# Patient Record
Sex: FEMALE | Race: BLACK OR AFRICAN AMERICAN | NOT HISPANIC OR LATINO | URBAN - METROPOLITAN AREA
[De-identification: names, ages, dates, MRNs, and addresses within clinical notes are randomized per-mention and may not be internally consistent; named-entity substitution may affect disease eponyms.]

---

## 2018-08-20 ENCOUNTER — APPOINTMENT (OUTPATIENT)
Dept: RADIOLOGY | Facility: MEDICAL CENTER | Age: 53
End: 2018-08-20
Attending: EMERGENCY MEDICINE
Payer: COMMERCIAL

## 2018-08-20 ENCOUNTER — HOSPITAL ENCOUNTER (EMERGENCY)
Facility: MEDICAL CENTER | Age: 53
End: 2018-08-20
Attending: EMERGENCY MEDICINE
Payer: COMMERCIAL

## 2018-08-20 VITALS
OXYGEN SATURATION: 98 % | DIASTOLIC BLOOD PRESSURE: 85 MMHG | BODY MASS INDEX: 33.55 KG/M2 | RESPIRATION RATE: 20 BRPM | HEIGHT: 66 IN | SYSTOLIC BLOOD PRESSURE: 142 MMHG | WEIGHT: 208.78 LBS | TEMPERATURE: 98 F | HEART RATE: 72 BPM

## 2018-08-20 DIAGNOSIS — M75.21 BICIPITAL TENDONITIS OF RIGHT SHOULDER: ICD-10-CM

## 2018-08-20 LAB
ALBUMIN SERPL BCP-MCNC: 4 G/DL (ref 3.2–4.9)
ALBUMIN/GLOB SERPL: 1.1 G/DL
ALP SERPL-CCNC: 83 U/L (ref 30–99)
ALT SERPL-CCNC: 19 U/L (ref 2–50)
ANION GAP SERPL CALC-SCNC: 9 MMOL/L (ref 0–11.9)
AST SERPL-CCNC: 17 U/L (ref 12–45)
BASOPHILS # BLD AUTO: 0.5 % (ref 0–1.8)
BASOPHILS # BLD: 0.06 K/UL (ref 0–0.12)
BILIRUB SERPL-MCNC: 0.4 MG/DL (ref 0.1–1.5)
BNP SERPL-MCNC: 13 PG/ML (ref 0–100)
BUN SERPL-MCNC: 9 MG/DL (ref 8–22)
CALCIUM SERPL-MCNC: 9.7 MG/DL (ref 8.5–10.5)
CHLORIDE SERPL-SCNC: 100 MMOL/L (ref 96–112)
CO2 SERPL-SCNC: 27 MMOL/L (ref 20–33)
CREAT SERPL-MCNC: 0.76 MG/DL (ref 0.5–1.4)
EKG IMPRESSION: NORMAL
EOSINOPHIL # BLD AUTO: 0.14 K/UL (ref 0–0.51)
EOSINOPHIL NFR BLD: 1.2 % (ref 0–6.9)
ERYTHROCYTE [DISTWIDTH] IN BLOOD BY AUTOMATED COUNT: 45 FL (ref 35.9–50)
GLOBULIN SER CALC-MCNC: 3.8 G/DL (ref 1.9–3.5)
GLUCOSE SERPL-MCNC: 265 MG/DL (ref 65–99)
HCT VFR BLD AUTO: 39.9 % (ref 37–47)
HGB BLD-MCNC: 12.2 G/DL (ref 12–16)
IMM GRANULOCYTES # BLD AUTO: 0.07 K/UL (ref 0–0.11)
IMM GRANULOCYTES NFR BLD AUTO: 0.6 % (ref 0–0.9)
LYMPHOCYTES # BLD AUTO: 3.98 K/UL (ref 1–4.8)
LYMPHOCYTES NFR BLD: 32.9 % (ref 22–41)
MCH RBC QN AUTO: 26.8 PG (ref 27–33)
MCHC RBC AUTO-ENTMCNC: 30.6 G/DL (ref 33.6–35)
MCV RBC AUTO: 87.7 FL (ref 81.4–97.8)
MONOCYTES # BLD AUTO: 0.92 K/UL (ref 0–0.85)
MONOCYTES NFR BLD AUTO: 7.6 % (ref 0–13.4)
NEUTROPHILS # BLD AUTO: 6.94 K/UL (ref 2–7.15)
NEUTROPHILS NFR BLD: 57.2 % (ref 44–72)
NRBC # BLD AUTO: 0 K/UL
NRBC BLD-RTO: 0 /100 WBC
PLATELET # BLD AUTO: 305 K/UL (ref 164–446)
PMV BLD AUTO: 11.3 FL (ref 9–12.9)
POTASSIUM SERPL-SCNC: 4.2 MMOL/L (ref 3.6–5.5)
PROT SERPL-MCNC: 7.8 G/DL (ref 6–8.2)
RBC # BLD AUTO: 4.55 M/UL (ref 4.2–5.4)
SODIUM SERPL-SCNC: 136 MMOL/L (ref 135–145)
TROPONIN I SERPL-MCNC: <0.01 NG/ML (ref 0–0.04)
TROPONIN I SERPL-MCNC: <0.01 NG/ML (ref 0–0.04)
WBC # BLD AUTO: 12.1 K/UL (ref 4.8–10.8)

## 2018-08-20 PROCEDURE — A9270 NON-COVERED ITEM OR SERVICE: HCPCS | Performed by: EMERGENCY MEDICINE

## 2018-08-20 PROCEDURE — 99284 EMERGENCY DEPT VISIT MOD MDM: CPT

## 2018-08-20 PROCEDURE — 700102 HCHG RX REV CODE 250 W/ 637 OVERRIDE(OP): Performed by: EMERGENCY MEDICINE

## 2018-08-20 PROCEDURE — 93005 ELECTROCARDIOGRAM TRACING: CPT | Performed by: EMERGENCY MEDICINE

## 2018-08-20 PROCEDURE — 85025 COMPLETE CBC W/AUTO DIFF WBC: CPT

## 2018-08-20 PROCEDURE — 93005 ELECTROCARDIOGRAM TRACING: CPT

## 2018-08-20 PROCEDURE — 84484 ASSAY OF TROPONIN QUANT: CPT

## 2018-08-20 PROCEDURE — 71045 X-RAY EXAM CHEST 1 VIEW: CPT

## 2018-08-20 PROCEDURE — 36415 COLL VENOUS BLD VENIPUNCTURE: CPT

## 2018-08-20 PROCEDURE — 80053 COMPREHEN METABOLIC PANEL: CPT

## 2018-08-20 PROCEDURE — 83880 ASSAY OF NATRIURETIC PEPTIDE: CPT

## 2018-08-20 RX ORDER — IBUPROFEN 600 MG/1
600 TABLET ORAL ONCE
Status: COMPLETED | OUTPATIENT
Start: 2018-08-20 | End: 2018-08-20

## 2018-08-20 RX ORDER — CYCLOBENZAPRINE HCL 10 MG
10 TABLET ORAL 3 TIMES DAILY PRN
Qty: 15 TAB | Refills: 0 | Status: SHIPPED
Start: 2018-08-20

## 2018-08-20 RX ORDER — LISINOPRIL 10 MG/1
10 TABLET ORAL DAILY
COMMUNITY

## 2018-08-20 RX ORDER — IBUPROFEN 800 MG/1
800 TABLET ORAL EVERY 8 HOURS PRN
Qty: 30 TAB | Refills: 0 | Status: SHIPPED
Start: 2018-08-20

## 2018-08-20 RX ADMIN — IBUPROFEN 600 MG: 600 TABLET, FILM COATED ORAL at 11:17

## 2018-08-20 ASSESSMENT — PAIN SCALES - GENERAL
PAINLEVEL_OUTOF10: 2
PAINLEVEL_OUTOF10: 9

## 2018-08-20 NOTE — ED NOTES
Break RN: Patient ambulatory back to RD [address] from [facility] w/steady gait.  Urine specimen sent to lab.  ERP at bedside at this time.

## 2018-08-20 NOTE — ED NOTES
Explained discharge instructions to patient with all questions answered.  Patient encouraged to follow up with PCP, and return to the ER for worsening symptoms.  VSS upon discharge.  Patient ambulated to lobby with steady gait.  Patient confirmed that he had a safe way to get home.

## 2018-08-20 NOTE — DISCHARGE INSTRUCTIONS
Bicipital Tendonitis  Bicipital tendonitis refers to redness, soreness, and swelling (inflammation) or irritation of the bicep tendon. The biceps muscle is located between the elbow and shoulder of the inner arm. The tendon heads, similar to pieces of rope, connect the bicep muscle to the shoulder socket. They are called short head and long head tendons. When tendonitis occurs, the long head tendon is inflamed and swollen, and may be thickened or partially torn.   Bicipital tendonitis can occur with other problems as well, such as arthritis in the shoulder or acromioclavicular joints, tears in the tendons, or other rotator cuff problems.   CAUSES   Overuse of of the arms for overhead activities is the major cause of tendonitis. Many athletes, such as swimmers, baseball players, and tennis players are prone to bicipital tendonitis. Jobs that require manual labor or routine chores, especially chores involving overhead activities can result in overuse and tendonitis.  SYMPTOMS  Symptoms may include:  · Pain in and around the front of the shoulder. Pain may be worse with overhead motion.  · Pain or aching that radiates down the arm.  · Clicking or shifting sensations in the shoulder.  DIAGNOSIS  Your caregiver may perform the following:  · Physical exam and tests of the biceps and shoulder to observe range of motion, strength, and stability.  · X-rays or magnetic resonance imaging (MRI) to confirm the diagnosis. In most common cases, these tests are not necessary.  Since other problems may exist in the shoulder or rotator cuff, additional tests may be recommended.  TREATMENT  Treatment may include the following:  · Medications  ¨ Your caregiver may prescribe over-the-counter pain relievers.  ¨ Steroid injections, such as cortisone, may be recommended. These may help to reduce inflammation and pain.  · Physical Therapy - Your caregiver may recommend gentle exercises with the arm. These can help restore strength and  range of motion. They may be done at home or with a physical therapist's supervision and input.  · Surgery - Arthroscopic or open surgery sometimes is necessary. Surgery may include:  ¨ Reattachment or repair of the tendon at the shoulder socket.  ¨ Removal of the damaged section of the tendon.  ¨ Anchoring the tendon to a different area of the shoulder (tenodesis).  HOME CARE INSTRUCTIONS   · Avoid overhead motion of the affected arm or any other motion that causes pain.  · Take medication for pain as directed. Do not take these for more than 3 weeks, unless directed to do so by your caregiver.  · Ice the affected area for 20 minutes at a time, 3-4 times per day. Place a towel on the skin over the painful area and the ice or cold pack over the towel. Do not place ice directly on the skin.  · Perform gentle exercises at home as directed. These will increase strength and flexibility.  PREVENTION  · Modify your activities as much as possible to protect your arm. A physical therapist or sports medicine physician can help you understand options for safe motion.  · Avoid repetitive overhead pulling, lifting, reaching, and throwing until your caregiver tells you it is ok to resume these activities.  SEEK MEDICAL CARE IF:  · Your pain worsens.  · You have difficulty moving the affected arm.  · You have trouble performing any of the self-care instructions.  MAKE SURE YOU:   · Understand these instructions.  · Will watch your condition.  · Will get help right away if you are not doing well or get worse.     This information is not intended to replace advice given to you by your health care provider. Make sure you discuss any questions you have with your health care provider.     Document Released: 01/20/2012 Document Revised: 03/11/2013 Document Reviewed: 01/20/2012  Elsevier Interactive Patient Education ©2016 [employer]  Muscle Strain  A muscle strain is an injury that occurs when a muscle is stretched beyond its normal  length. Usually a small number of muscle fibers are torn when this happens. Muscle strain is rated in degrees. First-degree strains have the least amount of muscle fiber tearing and pain. Second-degree and third-degree strains have increasingly more tearing and pain.  Usually, recovery from muscle strain takes 1-2 weeks. Complete healing takes 5-6 weeks.  What are the causes?  Muscle strain happens when a sudden, violent force placed on a muscle stretches it too far. This may occur with lifting, sports, or a fall.  What increases the risk?  Muscle strain is especially common in athletes.  What are the signs or symptoms?  At the site of the muscle strain, there may be:  · Pain.  · Bruising.  · Swelling.  · Difficulty using the muscle due to pain or lack of normal function.  How is this diagnosed?  Your health care provider will perform a physical exam and ask about your medical history.  How is this treated?  Often, the best treatment for a muscle strain is resting, icing, and applying cold compresses to the injured area.  Follow these instructions at home:  · Use the PRICE method of treatment to promote muscle healing during the first 2-3 days after your injury. The PRICE method involves:  ¨ Protecting the muscle from being injured again.  ¨ Restricting your activity and resting the injured body part.  ¨ Icing your injury. To do this, put ice in a plastic bag. Place a towel between your skin and the bag. Then, apply the ice and leave it on from 15-20 minutes each hour. After the third day, switch to moist heat packs.  ¨ Apply compression to the injured area with a splint or elastic bandage. Be careful not to wrap it too tightly. This may interfere with blood circulation or increase swelling.  ¨ Elevate the injured body part above the level of your heart as often as you can.  · Only take over-the-counter or prescription medicines for pain, discomfort, or fever as directed by your health care provider.  · Warming up  prior to exercise helps to prevent future muscle strains.  Contact a health care provider if:  · You have increasing pain or swelling in the injured area.  · You have numbness, tingling, or a significant loss of strength in the injured area.  This information is not intended to replace advice given to you by your health care provider. Make sure you discuss any questions you have with your health care provider.  Document Released: 12/18/2006 Document Revised: 05/25/2017 Document Reviewed: 07/17/2014  Elsevier Interactive Patient Education © 2017 [employer]

## 2018-08-20 NOTE — ED PROVIDER NOTES
ED Provider Note    CHIEF COMPLAINT  Chief Complaint   Patient presents with   • Arm Pain   • Extremity Weakness     right arm       HPI  Annalee Hillman is a 53 y.o. female here for evaluation of right shoulder pain.  The patient states that she has had progressive right shoulder pain over the last day, and states that the pain causes her handgrip to be weak.  She has no fever, no vomiting, no chest pain, and no shortness of breath.  She and her  flew up from [place] this morning, and are here visiting.  She has no diaphoresis, no back pain, and no leg pain.  She states that the arm pain is reproducible with raising her arm and moving it in circles.  She is not taking anything for the pain, and states that it is exacerbated by rotating the shoulder, and alleviated by remaining still.    PAST MEDICAL HISTORY   has a past medical history of Diabetes (HCC) and Hypertension.    SOCIAL HISTORY  Social History     Social History Main Topics   • Smoking status: Never Smoker   • Smokeless tobacco: Never Used   • Alcohol use Yes      Comment: Socially   • Drug use: No   • Sexual activity: Not on file       SURGICAL HISTORY  patient denies any surgical history    CURRENT MEDICATIONS  Home Medications     Reviewed by Dg Andersen R.N. (Registered Nurse) on 08/20/18 at 0928  Med List Status: Complete   Medication Last Dose Status   lisinopril (PRINIVIL) 10 MG Tab  Active   metFORMIN (GLUCOPHAGE) 500 MG Tab  Active                ALLERGIES  No Known Allergies    REVIEW OF SYSTEMS  See HPI for further details. Review of systems as above, otherwise all other systems are negative.     PHYSICAL EXAM  Constitutional: Well developed, well nourished. No acute distress.  HEENT: Normocephalic, atraumatic. Posterior pharynx clear and moist.  Eyes:  EOMI. Normal sclera.  Neck: Supple, Full range of motion, nontender.  Chest/Pulmonary: clear to ausculation. Symmetrical expansion.   Cardio: Regular rate and rhythm with no murmur.    Abdomen: Soft, nontender. No peritoneal signs. No guarding. No palpable masses.  Back: No CVA tenderness, nontender midline, no step offs.  Musculoskeletal: No deformity, no edema, neurovascular intact.  Right bicipital groove tenderness, tenderness with range of motion of the right upper extremity.  Neurovascular intact distally.  No ecchymosis.  Reproducible pain with abduction and abduction.  Neuro: Clear speech, appropriate, cooperative, cranial nerves II-XII grossly intact.  Psych: Normal mood and affect    Results for orders placed or performed during the hospital encounter of 08/20/18   CBC w/ Differential   Result Value Ref Range    WBC 12.1 (H) 4.8 - 10.8 K/uL    RBC 4.55 4.20 - 5.40 M/uL    Hemoglobin 12.2 12.0 - 16.0 g/dL    Hematocrit 39.9 37.0 - 47.0 %    MCV 87.7 81.4 - 97.8 fL    MCH 26.8 (L) 27.0 - 33.0 pg    MCHC 30.6 (L) 33.6 - 35.0 g/dL    RDW 45.0 35.9 - 50.0 fL    Platelet Count 305 164 - 446 K/uL    MPV 11.3 9.0 - 12.9 fL    Neutrophils-Polys 57.20 44.00 - 72.00 %    Lymphocytes 32.90 22.00 - 41.00 %    Monocytes 7.60 0.00 - 13.40 %    Eosinophils 1.20 0.00 - 6.90 %    Basophils 0.50 0.00 - 1.80 %    Immature Granulocytes 0.60 0.00 - 0.90 %    Nucleated RBC 0.00 /100 WBC    Neutrophils (Absolute) 6.94 2.00 - 7.15 K/uL    Lymphs (Absolute) 3.98 1.00 - 4.80 K/uL    Monos (Absolute) 0.92 (H) 0.00 - 0.85 K/uL    Eos (Absolute) 0.14 0.00 - 0.51 K/uL    Baso (Absolute) 0.06 0.00 - 0.12 K/uL    Immature Granulocytes (abs) 0.07 0.00 - 0.11 K/uL    NRBC (Absolute) 0.00 K/uL   Complete Metabolic Panel (CMP)   Result Value Ref Range    Sodium 136 135 - 145 mmol/L    Potassium 4.2 3.6 - 5.5 mmol/L    Chloride 100 96 - 112 mmol/L    Co2 27 20 - 33 mmol/L    Anion Gap 9.0 0.0 - 11.9    Glucose 265 (H) 65 - 99 mg/dL    Bun 9 8 - 22 mg/dL    Creatinine 0.76 0.50 - 1.40 mg/dL    Calcium 9.7 8.5 - 10.5 mg/dL    AST(SGOT) 17 12 - 45 U/L    ALT(SGPT) 19 2 - 50 U/L    Alkaline Phosphatase 83 30 - 99 U/L    Total  Bilirubin 0.4 0.1 - 1.5 mg/dL    Albumin 4.0 3.2 - 4.9 g/dL    Total Protein 7.8 6.0 - 8.2 g/dL    Globulin 3.8 (H) 1.9 - 3.5 g/dL    A-G Ratio 1.1 g/dL   Troponin STAT   Result Value Ref Range    Troponin I <0.01 0.00 - 0.04 ng/mL   Btype Natriuretic Peptide   Result Value Ref Range    B Natriuretic Peptide 13 0 - 100 pg/mL   ESTIMATED GFR   Result Value Ref Range    GFR If African American >60 >60 mL/min/1.73 m 2    GFR If Non African American >60 >60 mL/min/1.73 m 2   Troponin in two (2) hours   Result Value Ref Range    Troponin I <0.01 0.00 - 0.04 ng/mL   EKG (ER)   Result Value Ref Range    Report       [facility]    Test Date:  2018  Pt Name:    TESS TO                 Department: ER  MRN:        [id number]                      Room:  Gender:     Female                       Technician: [id number]  :        [id number]                   Requested By:ER TRIAGE PROTOCOL  Order #:    [id number]                    Reading MD:    Measurements  Intervals                                Axis  Rate:       84                           P:          120  NM:         120                          QRS:        -11  QRSD:       126                          T:          31  QT:         432  QTc:        511    Interpretive Statements  SINUS RHYTHM  NONSPECIFIC INTRAVENTRICULAR CONDUCTION DELAY  PROBABLE LEFT VENTRICULAR HYPERTROPHY  LATERAL INFARCT, OLD  No previous ECG available for comparison         DX-CHEST-LIMITED (1 VIEW)   Final Result      No evidence of acute cardiopulmonary process.            PROCEDURES     MEDICAL RECORD  I have reviewed patient's medical record and pertinent results are listed above.    COURSE & MEDICAL DECISION MAKING  I have reviewed any medical record information, laboratory studies and radiographic results as noted above.    If you have had any blood pressure issues while here in the emergency department, please see your doctor for a further evaluation or  work up.    11:42 AM  The patient is nontoxic appearing, afebrile, and has reproducible shoulder pain with rotation, abduction, and abduction.  She will be given medications here for the same.  The patient has 2 negative troponins, and a negative acute EKG.     12:35 PM  Pt currently states she feels better after the medications.     Differential diagnoses include but not limited to: MI, musculoskeletal strain, PE, pneumonia    This patient presents with  .  At this time, I have counseled the patient/family regarding their medications, pain control, and follow up.  They will continue their medications, if any, as prescribed.  They will return immediately for any worsening symptoms and/or any other medical concerns.  They will see their doctor, or contact the doctor provided, in 1-2 days for follow up.       FINAL IMPRESSION  1. Bicipital tendonitis of right shoulder          Electronically signed by: Trell Starkey, 8/20/2018 11:39 AM

## 2018-08-20 NOTE — ED TRIAGE NOTES
52 y/o female ambulatory to triage for evaluation of pain in her right arm. Pt states the pain began last night after traveling to [place] from [place]. Pt states the pain begins in her shoulder and travels down her arm causing weakness. No other unilateral weakness noted, right arm  is weaker on the right side, face symmetrical, no speech difficulties noted. Pt denies chest pain, sob, n/v/d. Called for EKG.